# Patient Record
Sex: FEMALE | Race: WHITE
[De-identification: names, ages, dates, MRNs, and addresses within clinical notes are randomized per-mention and may not be internally consistent; named-entity substitution may affect disease eponyms.]

---

## 2018-07-10 ENCOUNTER — HOSPITAL ENCOUNTER (EMERGENCY)
Dept: HOSPITAL 25 - UCCORT | Age: 38
Discharge: HOME | End: 2018-07-10
Payer: COMMERCIAL

## 2018-07-10 VITALS — DIASTOLIC BLOOD PRESSURE: 72 MMHG | SYSTOLIC BLOOD PRESSURE: 125 MMHG

## 2018-07-10 DIAGNOSIS — N39.0: Primary | ICD-10-CM

## 2018-07-10 DIAGNOSIS — Z88.5: ICD-10-CM

## 2018-07-10 PROCEDURE — 99212 OFFICE O/P EST SF 10 MIN: CPT

## 2018-07-10 PROCEDURE — 81003 URINALYSIS AUTO W/O SCOPE: CPT

## 2018-07-10 PROCEDURE — G0463 HOSPITAL OUTPT CLINIC VISIT: HCPCS

## 2018-07-10 PROCEDURE — 87086 URINE CULTURE/COLONY COUNT: CPT

## 2018-07-10 NOTE — UC
Complaint Female HPI





- HPI Summary


HPI Summary: 





pain with urination, frequency x 2 days


no fever, no chills, no back / flank pain 








- History Of Current Complaint


Chief Complaint: UCGU


Stated Complaint: URINARY


Time Seen by Provider: 07/10/18 08:20


Hx Obtained From: Patient


Pregnant?: Yes


Onset/Duration: Gradual Onset, Lasting Days - 2, Still Present


Timing: Constant


Severity Initially: Moderate


Severity Currently: Moderate


Pain Intensity: 0


Character: Burning


Aggravating Factor(s): Nothing


Alleviating Factor(s): Nothing


Associated Signs And Symptoms: Negative: Fever, Back Pain, Vaginal Bleeding/

Discharge, Vaginal Discharge, Nausea, Vomiting(# Of Episodes =), Genital 

Swelling, Genital Blisters, Retained Foregin Body (Specify)





- Allergies/Home Medications


Allergies/Adverse Reactions: 


 Allergies











Allergy/AdvReac Type Severity Reaction Status Date / Time


 


codeine Allergy  Itching Verified 07/10/18 08:27











Home Medications: 


 Home Medications





Cranberry Fruit Concentrate [Azo Cranberry Gummies Uri] 500 mg PO DAILY PRN 07/

10/18 [History Confirmed 07/10/18]


Famotidine [Pepcid AC] 10 mg PO DAILY 07/10/18 [History Confirmed 07/10/18]


Omega-3 Fatty Acids/Fish Oil [Fish Oil 1,000 mg Capsule] 1 each PO DAILY 07/10/

18 [History Confirmed 07/10/18]


Pnv No.95/Ferrous Fum/Folic AC [Prenatal Vitamin & Minera 28-0.8 mg] 1 tab PO 

DAILY 07/10/18 [History Confirmed 07/10/18]


Vitamin B Complex TAB* [B Complex-50*] 1 tab PO DAILY 07/10/18 [History 

Confirmed 07/10/18]











PMH/Surg Hx/FS Hx/Imm Hx


Previously Healthy: Yes





- Surgical History


Surgical History: Yes


Surgery Procedure, Year, and Place: R ovary- cyst removal.  fibroids L breast.  

endometriosis





- Family History


Known Family History: 


   Negative: Diabetes





- Social History


Alcohol Use: None


Substance Use Type: None


Smoking Status (MU): Never Smoked Tobacco





Review of Systems


Constitutional: Negative


Skin: Negative


Eyes: Negative


ENT: Negative


Respiratory: Negative


Cardiovascular: Negative


Gastrointestinal: Negative


Genitourinary: Dysuria, Frequency


Is Patient Immunocompromised?: No


All Other Systems Reviewed And Are Negative: Yes





Physical Exam


Triage Information Reviewed: Yes


Appearance: Well-Appearing, No Pain Distress, Well-Nourished


Vital Signs: 


 Initial Vital Signs











Temp  98.7 F   07/10/18 08:31


 


Pulse  78   07/10/18 08:31


 


Resp  16   07/10/18 08:31


 


BP  125/72   07/10/18 08:31


 


Pulse Ox  99   07/10/18 08:31











Vital Signs Reviewed: Yes


Eyes: Positive: Conjunctiva Clear


ENT: Positive: Normal ENT inspection, Hearing grossly normal, Pharynx normal


Neck exam: Normal


Neck: Positive: Supple, Nontender, No Lymphadenopathy


Respiratory: Positive: Chest non-tender, Lungs clear, Normal breath sounds


Cardiovascular: Positive: RRR, No Murmur, Pulses Normal


Abdominal Exam: Normal


Abdomen Description: Positive: Nontender, Soft.  Negative: CVA Tenderness (R), 

CVA Tenderness (L), Distended, Guarding


Bowel Sounds: Positive: Present





 Complaint Female Dx





- Differential Dx/Diagnosis


Provider Diagnoses: UTI





Discharge





- Sign-Out/Discharge


Documenting (check all that apply): Discharge/Admit/Transfer





- Discharge Plan


Condition: Stable


Disposition: HOME


Prescriptions: 


Cephalexin CAP* [Keflex 500 CAP*] 500 mg PO TID #21 cap


Patient Education Materials:  Urinary Tract Infection in Pregnancy (ED)


Referrals: 


Jodi Valdivia MD [Primary Care Provider] - 7 Days





- Billing Disposition and Condition


Condition: STABLE


Disposition: Home

## 2018-11-14 ENCOUNTER — HOSPITAL ENCOUNTER (INPATIENT)
Dept: HOSPITAL 25 - MCHOBOUT | Age: 38
LOS: 3 days | Discharge: HOME | DRG: 540 | End: 2018-11-17
Attending: OBSTETRICS & GYNECOLOGY | Admitting: MIDWIFE
Payer: COMMERCIAL

## 2018-11-14 DIAGNOSIS — Z3A.41: ICD-10-CM

## 2018-11-14 DIAGNOSIS — Z88.5: ICD-10-CM

## 2018-11-14 DIAGNOSIS — R00.0: ICD-10-CM

## 2018-11-14 DIAGNOSIS — O41.1230: ICD-10-CM

## 2018-11-14 DIAGNOSIS — E03.9: ICD-10-CM

## 2018-11-14 DIAGNOSIS — O48.0: Primary | ICD-10-CM

## 2018-11-14 DIAGNOSIS — R11.2: ICD-10-CM

## 2018-11-14 LAB
ALBUMIN SERPL BCG-MCNC: 3.4 G/DL (ref 3.2–5.2)
ALBUMIN/GLOB SERPL: 1.3 {RATIO} (ref 1–3)
ALP SERPL-CCNC: 143 U/L (ref 34–104)
ALT SERPL W P-5'-P-CCNC: 30 U/L (ref 7–52)
ANION GAP SERPL CALC-SCNC: 8 MMOL/L (ref 2–11)
AST SERPL-CCNC: (no result) U/L (ref 13–39)
BASOPHILS # BLD AUTO: 0.1 10^3/UL (ref 0–0.2)
BUN SERPL-MCNC: 7 MG/DL (ref 6–24)
BUN/CREAT SERPL: 12.7 (ref 8–20)
CALCIUM SERPL-MCNC: 9.1 MG/DL (ref 8.6–10.3)
CHLORIDE SERPL-SCNC: 106 MMOL/L (ref 101–111)
EOSINOPHIL # BLD AUTO: 0.1 10^3/UL (ref 0–0.6)
GLOBULIN SER CALC-MCNC: 2.6 G/DL (ref 2–4)
GLUCOSE SERPL-MCNC: 94 MG/DL (ref 70–100)
HCO3 SERPL-SCNC: 23 MMOL/L (ref 22–32)
HCT VFR BLD AUTO: 39 % (ref 35–47)
HGB BLD-MCNC: 12.7 G/DL (ref 12–16)
LYMPHOCYTES # BLD AUTO: 1.9 10^3/UL (ref 1–4.8)
MCH RBC QN AUTO: 27 PG (ref 27–31)
MCHC RBC AUTO-ENTMCNC: 33 G/DL (ref 31–36)
MCV RBC AUTO: 82 FL (ref 80–97)
MONOCYTES # BLD AUTO: 0.8 10^3/UL (ref 0–0.8)
NEUTROPHILS # BLD AUTO: 13.7 10^3/UL (ref 1.5–7.7)
NRBC # BLD AUTO: 0 10^3/UL
NRBC BLD QL AUTO: 0
PLATELET # BLD AUTO: 249 10^3/UL (ref 150–450)
POTASSIUM SERPL-SCNC: (no result) MMOL/L (ref 3.5–5)
PROT SERPL-MCNC: 6 G/DL (ref 6.4–8.9)
RBC # BLD AUTO: 4.71 10^6/UL (ref 4–5.4)
RBC UR QL AUTO: (no result)
SODIUM SERPL-SCNC: 137 MMOL/L (ref 135–145)
URATE SERPL-MCNC: 4.5 MG/DL (ref 2.3–6.6)
WBC # BLD AUTO: 16.6 10^3/UL (ref 3.5–10.8)
WBC UR QL AUTO: (no result)

## 2018-11-14 PROCEDURE — 86850 RBC ANTIBODY SCREEN: CPT

## 2018-11-14 PROCEDURE — 81003 URINALYSIS AUTO W/O SCOPE: CPT

## 2018-11-14 PROCEDURE — 3E033VJ INTRODUCTION OF OTHER HORMONE INTO PERIPHERAL VEIN, PERCUTANEOUS APPROACH: ICD-10-PCS | Performed by: OBSTETRICS & GYNECOLOGY

## 2018-11-14 PROCEDURE — 84550 ASSAY OF BLOOD/URIC ACID: CPT

## 2018-11-14 PROCEDURE — 36415 COLL VENOUS BLD VENIPUNCTURE: CPT

## 2018-11-14 PROCEDURE — 10907ZC DRAINAGE OF AMNIOTIC FLUID, THERAPEUTIC FROM PRODUCTS OF CONCEPTION, VIA NATURAL OR ARTIFICIAL OPENING: ICD-10-PCS | Performed by: OBSTETRICS & GYNECOLOGY

## 2018-11-14 PROCEDURE — 80053 COMPREHEN METABOLIC PANEL: CPT

## 2018-11-14 PROCEDURE — 85025 COMPLETE CBC W/AUTO DIFF WBC: CPT

## 2018-11-14 PROCEDURE — 87086 URINE CULTURE/COLONY COUNT: CPT

## 2018-11-14 PROCEDURE — 81015 MICROSCOPIC EXAM OF URINE: CPT

## 2018-11-14 PROCEDURE — 88307 TISSUE EXAM BY PATHOLOGIST: CPT

## 2018-11-14 PROCEDURE — 86900 BLOOD TYPING SEROLOGIC ABO: CPT

## 2018-11-14 PROCEDURE — 86901 BLOOD TYPING SEROLOGIC RH(D): CPT

## 2018-11-14 PROCEDURE — 4A1HXCZ MONITORING OF PRODUCTS OF CONCEPTION, CARDIAC RATE, EXTERNAL APPROACH: ICD-10-PCS | Performed by: OBSTETRICS & GYNECOLOGY

## 2018-11-14 NOTE — PN
Progress Note





- Progress Note


Date of Service: 11/14/18


SOAP: 


Subjective:


[Pt feeling a lot of discomfort with ctx, coping well. Has tried various 

positions, shower. Declines pain meds. Feels active fetal movement. Still 

leaking clear fluid. ]








Objective:


[, moderate variability, + accels, no decels noted


UC's every 2-4 minutes, moderate strength, lasting 60-80 seconds


Afebrile


Previous cervical exam showed pt 6cm/ 80%/ 0 station at 1940, now 6-7 cm/ 100%/ 

0 station ]








Assessment:


[Pt in active labor, slow progress. FHR Cat I, no evidence of fetal acidemia.\


]








Plan:


[Will continue to try different positions, may try nitrous or receive epidural 

if desired.]

## 2018-11-14 NOTE — PN
Progress Note





- Progress Note


Date of Service: 18


SOAP: 


Subjective:


[Pt reports she is feeling contractions now, and that they are getting stronger 

and closer together. Currently reports ctx Q 5 minutes. Reports continued 

leaking of fluid. ]








Objective:


[BP persistently elevated, ranging from 123/86 to 141/86


 by auscultation


UC's every 3-5 minutes, mild to moderate in intensity


Continued leaking of clear fluid


UA negative for protein]








Assessment:


[38 year old  at 40 6/7 weeks gestation with ruptured membranes in active 

labor


Gestational HTN vs situational HTN vs preeclampsia]








Plan:


[IV started, labs collected including CBC, Type and Screen, CMP, uric acid


Will continue intermittent monitoring of FHR, labor support]

## 2018-11-14 NOTE — HP
General Information





- General Information


Maternal Age: 38


Grav: 1


Para: 0


SAB: 0


IEA: 0





Estimated Due Date: 10/19/18


Determined By: Early Ultrasound


Maternal Blood Type and Rh: A Positive





- Results this Pregnancy


Serology/RPR Result: Non-Reactive


Rubella Result: Immune


HBsAg Result: Negative


HIV Result: Negative


GBS Culture Result: Negative





Past Medical History


Delivery History: See  Records - Primigravida


Pertinent Past Medical History: See  Records - depression/ anxiety, 

endometriosis, fibrocystic disease of breast


Pertinent Past Surgical History: See  Records - ovarian cystectomy, 

lumpectomy left breast, laparoscopy, endometriosis


Pertinent Family History: See  Records - stroke, heart disease, 

ovarian cancer





- Antepartal Records


Antepartal Records: Reviewed, Pregnancy Complicated by: - IVF pregnancy, age 38 

at delivery





Review of Systems


Constitutional: Comfortable


CV Complaint: No


Respiratory: Shortness of Breath: No


Gastrointestinal: No Nausea/Vomiting, Normal Bowel Movement


Genitourinary: No Dysuria


Musculoskeletal: No Complaint, No Epigastric Pain


Neurological: No Headache, No Visual Changes


Fetal Movement: Normal





Exam


Allergies/Adverse Reactions: 


Allergies





codeine Allergy (Mild, Verified 18 14:59)


 Itching











T-99.1, P-80, R-18, /86, O2-98%





- Measurements


Height: 5 ft 3 in


Weight: 91.172 kg


Weight in lbs: 201.526619


Body Mass Index (BMI): 35.6


Pre-Pregnancy Weight: 68.039 kg


Weight Gained This Pregnancy: 51 lbs and 0 ozs





- Exam


Breast: Breast Exam Deferred


CVA: No CVA Tenderness


Extremities: Edema - Severe pitting edema feet to thighs


Heart: Normal Rhythm/Heart Sounds


HEENT: No Significant Findings


Lungs: Clear Bilaterally


Rectal: Rectal Exam Deferred


Reflexes: DTR 2+


Thyroid: No Thyromegaly





- Abdominal Exam


Abdomen Exam: Non-Tender, Fundal Height Consistent with Dates





- Ultrasound/Biophysical Profile


Ultrasound Status: Not Done





Targeted Exam Findings


See L&D Outpatient Visit Provider Note for Findings: N/A


Estimated Fetal Weight: 8#


Cervical Exam: 5cm


Effacement: 80%


Station: -1


Presenting Part: Vertex


Membrane Status: AROM


Amniotic Fluid Evaluation: Clear


Bleeding/Discharge: Bloody Show





EFM Findings





- External Fetal Monitor Findings


Baseline Fetal Heart Rate: 120


External Fetal Monitor Findings: Accelerations Present, No Pattern of Variable 

or Late Decelerations, Variability Moderate, Baseline Stable


Contractions: None





Assessment/Plan





- Assessment





38 year old  at 40 6/7 weeks gestation with no evidence of fetal acidemia 

here for augmentation of labor due to advanced dilation, post-dates, and severe 

bilateral edema. 





- Obstetrical Risk Factors


Obstetrical Risk Factors: Post-Dates, Assisted Reproduction





- Plan


Plan: Induction, Admit - Anticipate Vaginal Delivery


Plan Comment: 





Discussed options with pt, including initiating Pitocin augmentation vs AROM. 

Pt prefers AROM, as she has a preference for avoiding Pitocin if possible. Pt 

counseled on the risks of AROM including potential need to augment with Pitocin 

if ctx do not ensue, risk of infection if prolonged rupture of membranes. After 

discussing risks and benefits pt elects to proceed with AROM. 





- Date/Time of Admission


Date of Admission: 18


Time of Admission: 14:47

## 2018-11-15 RX ADMIN — OXYCODONE HYDROCHLORIDE AND ACETAMINOPHEN PRN TAB: 5; 325 TABLET ORAL at 15:18

## 2018-11-15 RX ADMIN — AMPICILLIN SODIUM SCH MLS/HR: 2 INJECTION, POWDER, FOR SOLUTION INTRAVENOUS at 08:27

## 2018-11-15 RX ADMIN — DOCUSATE SODIUM SCH MG: 100 CAPSULE, LIQUID FILLED ORAL at 21:21

## 2018-11-15 RX ADMIN — DOCUSATE SODIUM SCH MG: 100 CAPSULE, LIQUID FILLED ORAL at 15:18

## 2018-11-15 RX ADMIN — LEVOTHYROXINE SODIUM SCH: 25 TABLET ORAL at 07:00

## 2018-11-15 RX ADMIN — HYDROMORPHONE HYDROCHLORIDE PRN MG: 1 INJECTION, SOLUTION INTRAMUSCULAR; INTRAVENOUS; SUBCUTANEOUS at 12:22

## 2018-11-15 RX ADMIN — GENTAMICIN SULFATE SCH MLS/HR: 40 INJECTION, SOLUTION INTRAMUSCULAR; INTRAVENOUS at 08:55

## 2018-11-15 RX ADMIN — SIMETHICONE CHEW TAB 80 MG SCH MG: 80 TABLET ORAL at 15:19

## 2018-11-15 RX ADMIN — HYDROMORPHONE HYDROCHLORIDE PRN MG: 1 INJECTION, SOLUTION INTRAMUSCULAR; INTRAVENOUS; SUBCUTANEOUS at 12:40

## 2018-11-15 RX ADMIN — GENTAMICIN SULFATE SCH MLS/HR: 40 INJECTION, SOLUTION INTRAMUSCULAR; INTRAVENOUS at 17:11

## 2018-11-15 RX ADMIN — AMPICILLIN SODIUM SCH MLS/HR: 2 INJECTION, POWDER, FOR SOLUTION INTRAVENOUS at 21:21

## 2018-11-15 RX ADMIN — SIMETHICONE CHEW TAB 80 MG SCH MG: 80 TABLET ORAL at 21:21

## 2018-11-15 RX ADMIN — SIMETHICONE CHEW TAB 80 MG SCH MG: 80 TABLET ORAL at 17:55

## 2018-11-15 RX ADMIN — SODIUM CHLORIDE, SODIUM LACTATE, POTASSIUM CHLORIDE, AND CALCIUM CHLORIDE SCH MLS/HR: 600; 310; 30; 20 INJECTION, SOLUTION INTRAVENOUS at 02:14

## 2018-11-15 RX ADMIN — SODIUM CHLORIDE, SODIUM LACTATE, POTASSIUM CHLORIDE, AND CALCIUM CHLORIDE SCH MLS/HR: 600; 310; 30; 20 INJECTION, SOLUTION INTRAVENOUS at 04:39

## 2018-11-15 RX ADMIN — OXYCODONE HYDROCHLORIDE AND ACETAMINOPHEN PRN TAB: 5; 325 TABLET ORAL at 20:19

## 2018-11-15 RX ADMIN — IBUPROFEN PRN MG: 600 TABLET, FILM COATED ORAL at 17:55

## 2018-11-15 RX ADMIN — AMPICILLIN SODIUM SCH MLS/HR: 2 INJECTION, POWDER, FOR SOLUTION INTRAVENOUS at 15:51

## 2018-11-15 NOTE — PN
Progress Note





- Progress Note


Date of Service: 11/15/18


SOAP: 


Subjective:


[Pt now comfortable with epidural, trying to rest. ]








Objective:


[FHR: Baseline 130 / + accels/ no decels/ moderate variability


UC's Q 2-4 minutes


BPs remain elevated at 142/84 ]








Assessment:


[38 year old  at 41 0/7 weeks gestation in active labor w/ slow progress. 

No evidence of fetal acidemia]








Plan:


[Now that pt is comfortable, will encourage her to rest and recheck cervix in 1-

2 hours. Consulted with Dr. Rudolph. ]

## 2018-11-15 NOTE — PN
Progress Note





- Progress Note


Date of Service: 11/15/18


SOAP: 


Subjective:


[Pt extremely uncomfortable with ctx, moaning. Has tried many positions. After 

discussing options for pain felief, pt requests epidural. ]








Objective:


[EFM: , + accels, no decels, moderate variability


Cervix: 7cm/ 100%/ 0 station/ vtx


UC's 2-3 minutes moderate to strong


Fluid clear]








Assessment:


[Pt in active labor, minimal progress since last exam, very tired and ready for 

epidural. FHR with no evidence of fetal acidemia]








Plan:


[Dr. Manning paged for epidural. Will continue position changes, evaluate ctx 

pattern for adequacy. ]

## 2018-11-16 LAB
BASOPHILS # BLD AUTO: 0 10^3/UL (ref 0–0.2)
EOSINOPHIL # BLD AUTO: 0.1 10^3/UL (ref 0–0.6)
HCT VFR BLD AUTO: 30 % (ref 35–47)
HGB BLD-MCNC: 9.7 G/DL (ref 12–16)
LYMPHOCYTES # BLD AUTO: 1.7 10^3/UL (ref 1–4.8)
MCH RBC QN AUTO: 27 PG (ref 27–31)
MCHC RBC AUTO-ENTMCNC: 32 G/DL (ref 31–36)
MCV RBC AUTO: 83 FL (ref 80–97)
MONOCYTES # BLD AUTO: 1.4 10^3/UL (ref 0–0.8)
NEUTROPHILS # BLD AUTO: 23.8 10^3/UL (ref 1.5–7.7)
NRBC # BLD AUTO: 0 10^3/UL
NRBC BLD QL AUTO: 0
PLATELET # BLD AUTO: 206 10^3/UL (ref 150–450)
RBC # BLD AUTO: 3.61 10^6/UL (ref 4–5.4)
WBC # BLD AUTO: 27 10^3/UL (ref 3.5–10.8)

## 2018-11-16 RX ADMIN — OXYCODONE HYDROCHLORIDE AND ACETAMINOPHEN PRN TAB: 5; 325 TABLET ORAL at 00:47

## 2018-11-16 RX ADMIN — IBUPROFEN PRN MG: 600 TABLET, FILM COATED ORAL at 14:16

## 2018-11-16 RX ADMIN — LEVOTHYROXINE SODIUM SCH MCG: 25 TABLET ORAL at 05:08

## 2018-11-16 RX ADMIN — AMPICILLIN SODIUM SCH MLS/HR: 2 INJECTION, POWDER, FOR SOLUTION INTRAVENOUS at 04:46

## 2018-11-16 RX ADMIN — DOCUSATE SODIUM SCH MG: 100 CAPSULE, LIQUID FILLED ORAL at 08:48

## 2018-11-16 RX ADMIN — AMPICILLIN SODIUM SCH MLS/HR: 2 INJECTION, POWDER, FOR SOLUTION INTRAVENOUS at 08:49

## 2018-11-16 RX ADMIN — SIMETHICONE CHEW TAB 80 MG SCH MG: 80 TABLET ORAL at 21:10

## 2018-11-16 RX ADMIN — SIMETHICONE CHEW TAB 80 MG SCH MG: 80 TABLET ORAL at 14:16

## 2018-11-16 RX ADMIN — DOCUSATE SODIUM SCH MG: 100 CAPSULE, LIQUID FILLED ORAL at 21:09

## 2018-11-16 RX ADMIN — Medication SCH MG: at 21:09

## 2018-11-16 RX ADMIN — IBUPROFEN PRN MG: 600 TABLET, FILM COATED ORAL at 06:40

## 2018-11-16 RX ADMIN — SIMETHICONE CHEW TAB 80 MG SCH MG: 80 TABLET ORAL at 08:49

## 2018-11-16 RX ADMIN — OXYCODONE HYDROCHLORIDE AND ACETAMINOPHEN PRN TAB: 5; 325 TABLET ORAL at 15:30

## 2018-11-16 RX ADMIN — IBUPROFEN PRN MG: 600 TABLET, FILM COATED ORAL at 19:58

## 2018-11-16 RX ADMIN — GENTAMICIN SULFATE SCH MLS/HR: 40 INJECTION, SOLUTION INTRAMUSCULAR; INTRAVENOUS at 09:35

## 2018-11-16 RX ADMIN — OXYCODONE HYDROCHLORIDE AND ACETAMINOPHEN PRN TAB: 5; 325 TABLET ORAL at 21:11

## 2018-11-16 RX ADMIN — OXYCODONE HYDROCHLORIDE AND ACETAMINOPHEN PRN TAB: 5; 325 TABLET ORAL at 08:49

## 2018-11-16 RX ADMIN — DOCUSATE SODIUM SCH MG: 100 CAPSULE, LIQUID FILLED ORAL at 14:16

## 2018-11-16 RX ADMIN — GENTAMICIN SULFATE SCH MLS/HR: 40 INJECTION, SOLUTION INTRAMUSCULAR; INTRAVENOUS at 00:48

## 2018-11-16 RX ADMIN — IBUPROFEN PRN MG: 600 TABLET, FILM COATED ORAL at 00:47

## 2018-11-16 RX ADMIN — Medication SCH MG: at 08:49

## 2018-11-16 NOTE — OP
DATE OF OPERATION:  11/15/18 - [id number]

 

DATE OF BIRTH:  [birth year]

 

SURGEON:  Zonia White MD.

 

ASSISTANTS:  Kizzy Hall CNM.

 

PRE-OP DIAGNOSES:  Intrauterine gestation at 41+ weeks' gestational age, arrest 
of dilation, category 2 fetal heart tracing, chorioamnionitis.

 

POST-OP DIAGNOSES:  Intrauterine gestation at 41+ weeks' gestational age, 
arrest of dilation, category 2 fetal heart tracing, chorioamnionitis.

 

OPERATIVE PROCEDURE:  Primary low transverse  section.

 

ESTIMATED BLOOD LOSS:  600 mL.

 

FLUIDS:  Crystalloid.

 

DRAINS:  Sanchez catheter.

 

FINDINGS:  Male infant.  Weight 9 pounds 2 ounces.  Apgars 8 and 9.  Normal 
appearing placenta and cord.  Normal appearing uterus, ovaries, and tubes.  No 
obvious evidence of endometriosis.

 

INDICATIONS:  The patient presented to Labor and Delivery with advanced 
dilation and slowly progressed to labor to about 7 cm.  She had previously 
received an epidural which was no longer giving adequate pain control and even 
after attempt at re-dosing of the epidural, she was not receiving adequate pain 
control.  She had been 7 cm for more than 3 hours and had developed fetal 
tachycardia and maternal temp.  Therefore, the decision was made to proceed 
with primary  section.

 

DESCRIPTION OF PROCEDURE:  After informed consent was signed, the patient was 
taken to the operating room where she was given spinal anesthesia that was 
found to be adequate.  She was prepped and draped in the dorsal supine position 
with a leftward tilt.  She already had the Sanchez catheter in place.  SCDs were 
placed on her legs. A time-out was then performed.  A Pfannenstiel skin 
incision was then made with a scalpel and carried down to the underlying layer 
of fascia with blunt dissection. The fascia was incised on either side in the 
midline and the fascial incision extended laterally with sharp dissection with 
the Matias scissors.  The inferior edge of the fascial incision was grasped with 
Kocher clamps, tented up and dissected down bluntly.  Then the superior edge of 
the fascial incision was grasped with Kocher clamps, tented up and dissected 
down bluntly.  The rectus muscles were  in the midline and the 
peritoneum was entered bluntly.  On entry into the abdominal cavity, a very 
distended bladder was noted, therefore, the nurse was able to adjust the Sanchez 
catheter so that it began draining again and the bladder was no longer 
distended.  A bladder blade was then inserted and a transverse incision was 
made in the lower uterine segment with the scalpel.  The incision was extended 
superior and inferiorly with blunt pressure.  The infant's head was brought up 
to the incision and delivered with fundal pressure, followed by the shoulders, 
and the rest of the body.  The cord was milked towards the baby, then clamped 
x2 and cut. A second segment of cord was clamped and passed off for cord blood 
gases.  The placenta then delivered with fundal massage and gentle cord 
traction.  The uterus was exteriorized and cleared of clots and debris.  The 
uterine incision was then closed with 0 Vicryl in a running locked fashion with 
the second layer of suture imbricating the first.  By the time the uterine 
incision was closed, good tone was noted in the uterus and the incision 
appeared hemostatic.  The abdomen was then irrigated and the uterus was placed 
back into the abdominal cavity.  The incision was inspected and good hemostasis 
was noted.  Peritoneum was then closed with 3-0 Vicryl in a running unlocked 
fashion.  The fascia was closed with 0 Vicryl in a running unlocked fashion.  3-
0 Vicryl was used in interrupted sutures to reapproximate the subcuticular layer
, in 4 sutures.  The skin was then closed with 4-0 Monocryl in a running 
subcuticular fashion.  Mastisol and Steri-Strips were placed.  The incision was 
dressed.  The patient was cleaned and moved to the stretcher and taken to the 
recovery room in stable condition.

 

 [id number]/CPS #: [id number]

XAVI

## 2018-11-17 VITALS — SYSTOLIC BLOOD PRESSURE: 149 MMHG | DIASTOLIC BLOOD PRESSURE: 68 MMHG

## 2018-11-17 LAB
BASOPHILS # BLD AUTO: 0 10^3/UL (ref 0–0.2)
EOSINOPHIL # BLD AUTO: 0.2 10^3/UL (ref 0–0.6)
HCT VFR BLD AUTO: 31 % (ref 35–47)
HGB BLD-MCNC: 10.1 G/DL (ref 12–16)
LYMPHOCYTES # BLD AUTO: 1.8 10^3/UL (ref 1–4.8)
MCH RBC QN AUTO: 27 PG (ref 27–31)
MCHC RBC AUTO-ENTMCNC: 33 G/DL (ref 31–36)
MCV RBC AUTO: 83 FL (ref 80–97)
MONOCYTES # BLD AUTO: 0.6 10^3/UL (ref 0–0.8)
NEUTROPHILS # BLD AUTO: 15.7 10^3/UL (ref 1.5–7.7)
NRBC # BLD AUTO: 0 10^3/UL
NRBC BLD QL AUTO: 0
PLATELET # BLD AUTO: 283 10^3/UL (ref 150–450)
RBC # BLD AUTO: 3.7 10^6/UL (ref 4–5.4)
WBC # BLD AUTO: 18.3 10^3/UL (ref 3.5–10.8)

## 2018-11-17 RX ADMIN — SIMETHICONE CHEW TAB 80 MG SCH MG: 80 TABLET ORAL at 07:56

## 2018-11-17 RX ADMIN — IBUPROFEN PRN MG: 600 TABLET, FILM COATED ORAL at 07:55

## 2018-11-17 RX ADMIN — LEVOTHYROXINE SODIUM SCH MCG: 25 TABLET ORAL at 06:05

## 2018-11-17 RX ADMIN — OXYCODONE HYDROCHLORIDE AND ACETAMINOPHEN PRN TAB: 5; 325 TABLET ORAL at 02:05

## 2018-11-17 RX ADMIN — Medication SCH MG: at 07:56

## 2018-11-17 RX ADMIN — IBUPROFEN PRN MG: 600 TABLET, FILM COATED ORAL at 15:16

## 2018-11-17 RX ADMIN — SIMETHICONE CHEW TAB 80 MG SCH MG: 80 TABLET ORAL at 12:55

## 2018-11-17 RX ADMIN — DOCUSATE SODIUM SCH MG: 100 CAPSULE, LIQUID FILLED ORAL at 12:55

## 2018-11-17 RX ADMIN — OXYCODONE HYDROCHLORIDE AND ACETAMINOPHEN PRN TAB: 5; 325 TABLET ORAL at 12:55

## 2018-11-17 RX ADMIN — DOCUSATE SODIUM SCH MG: 100 CAPSULE, LIQUID FILLED ORAL at 07:56

## 2018-11-17 RX ADMIN — IBUPROFEN PRN MG: 600 TABLET, FILM COATED ORAL at 02:05
